# Patient Record
Sex: FEMALE
[De-identification: names, ages, dates, MRNs, and addresses within clinical notes are randomized per-mention and may not be internally consistent; named-entity substitution may affect disease eponyms.]

---

## 2021-11-26 ENCOUNTER — NURSE TRIAGE (OUTPATIENT)
Dept: OTHER | Facility: CLINIC | Age: 61
End: 2021-11-26

## 2021-11-26 NOTE — TELEPHONE ENCOUNTER
Reason for Disposition   [1] Widespread hives, itching or facial swelling AND [2] onset < 2 hours of exposure to high-risk allergen (e.g., sting, nuts, 1st dose of antibiotic)    Answer Assessment - Initial Assessment Questions  1. MAIN SYMPTOM: Luke Acosta is your main symptom? \" \"How bad is it? \"        Hives, knot in throat, was having chest pain, nauseated, dizziness    2. RESPIRATORY STATUS: Lobito Bradfordow you having difficulty breathing? \"  (e.g., yes/no, wheezing, unable to complete a sentence)       A little tight    3. SWALLOWING: \"Can you swallow? \" (e.g., yes/no, food, fluid, saliva)       Just feel nauseated     4. VASCULAR STATUS: Lobito Bradfordow you feeling weak? \" If so, ask: \"Can you stand and walk normally? \"      I can stand and walk    5. ONSET: \"When did the reaction start? \" (Minutes or hours ago)       12 hours after TDAP- Wednesday night    6. SUBSTANCE: \"What are you reacting to?\" \"When did the contact occur? \"       TDAP shot    7. PREVIOUS REACTION: \"Have you ever reacted to it before? \" If so, ask: \"What happened that time? \"      Denies    8. EPINEPHRINE: \"Do you have an epinephrine autoinjector (e.g., EpiPen)? \"      Dorina    Protocols used: ANAPHYLAXIS-ADULT-